# Patient Record
Sex: FEMALE | Race: WHITE | NOT HISPANIC OR LATINO | Employment: FULL TIME | RURAL
[De-identification: names, ages, dates, MRNs, and addresses within clinical notes are randomized per-mention and may not be internally consistent; named-entity substitution may affect disease eponyms.]

---

## 2020-07-14 ENCOUNTER — HISTORICAL (OUTPATIENT)
Dept: ADMINISTRATIVE | Facility: HOSPITAL | Age: 40
End: 2020-07-14

## 2021-02-17 ENCOUNTER — HISTORICAL (OUTPATIENT)
Dept: ADMINISTRATIVE | Facility: HOSPITAL | Age: 41
End: 2021-02-17

## 2021-02-24 ENCOUNTER — HISTORICAL (OUTPATIENT)
Dept: ADMINISTRATIVE | Facility: HOSPITAL | Age: 41
End: 2021-02-24

## 2022-03-03 ENCOUNTER — OFFICE VISIT (OUTPATIENT)
Dept: FAMILY MEDICINE | Facility: CLINIC | Age: 42
End: 2022-03-03
Payer: OTHER GOVERNMENT

## 2022-03-03 VITALS
HEIGHT: 63 IN | OXYGEN SATURATION: 98 % | TEMPERATURE: 98 F | WEIGHT: 142.38 LBS | BODY MASS INDEX: 25.23 KG/M2 | SYSTOLIC BLOOD PRESSURE: 116 MMHG | RESPIRATION RATE: 20 BRPM | DIASTOLIC BLOOD PRESSURE: 70 MMHG | HEART RATE: 93 BPM

## 2022-03-03 DIAGNOSIS — R42 VERTIGO: Primary | ICD-10-CM

## 2022-03-03 PROBLEM — E03.9 HYPOTHYROIDISM: Status: ACTIVE | Noted: 2022-03-03

## 2022-03-03 PROBLEM — Z85.850 HX OF PAPILLARY THYROID CARCINOMA: Status: ACTIVE | Noted: 2020-02-01

## 2022-03-03 PROBLEM — E89.0 POSTOPERATIVE HYPOTHYROIDISM: Status: ACTIVE | Noted: 2022-03-03

## 2022-03-03 PROCEDURE — 99213 PR OFFICE/OUTPT VISIT, EST, LEVL III, 20-29 MIN: ICD-10-PCS | Mod: ,,, | Performed by: NURSE PRACTITIONER

## 2022-03-03 PROCEDURE — 99213 OFFICE O/P EST LOW 20 MIN: CPT | Mod: ,,, | Performed by: NURSE PRACTITIONER

## 2022-03-03 RX ORDER — ONDANSETRON HYDROCHLORIDE 8 MG/1
8 TABLET, FILM COATED ORAL EVERY 8 HOURS PRN
Qty: 30 TABLET | Refills: 0 | Status: SHIPPED | OUTPATIENT
Start: 2022-03-03 | End: 2022-05-20

## 2022-03-03 RX ORDER — LEVOTHYROXINE SODIUM 137 UG/1
137 TABLET ORAL DAILY
COMMUNITY
Start: 2022-02-13

## 2022-03-03 RX ORDER — MECLIZINE HYDROCHLORIDE 25 MG/1
25 TABLET ORAL 3 TIMES DAILY PRN
Qty: 30 TABLET | Refills: 0 | Status: SHIPPED | OUTPATIENT
Start: 2022-03-03 | End: 2022-05-20

## 2022-03-03 NOTE — PROGRESS NOTES
[facility]       PATIENT NAME: Jennifer Mc   : 1980    AGE: 41 y.o. DATE: 2022    MRN: [id number]        Reason for Visit / Chief Complaint: Dizziness (Started yesterday morning. Worse with movement.)     Subjective:     Presents as a walk-in c/o dizziness described as spinning since yesterday morning. Intermittent. Improves when she lies down and gets still then resumes when she changes position. Nausea w/ vomiting yesterday, but only nausea today. Hasn't eaten anything.   No diarrhea, normal BMs.  Has had vertigo in the past but never this severe.  Tried Dramamine without help.   Works from home.    Last appt with Dr. Garcia 2022.    Review of Systems:     Review of Systems   Constitutional: Negative.    HENT: Negative.  Negative for congestion, ear pain, postnasal drip, rhinorrhea, sinus pressure and sore throat.    Eyes: Negative.    Respiratory: Negative.    Cardiovascular: Negative.    Gastrointestinal: Positive for nausea and vomiting. Negative for diarrhea.   Endocrine: Negative.         Hx papillary thyroid carcinoma. Thyroidectomy per Dr. Delcid III. Oncologist, Dr. Garcia is managing thyroid med.   Genitourinary: Negative.    Musculoskeletal: Negative.    Skin: Negative.    Allergic/Immunologic: Negative.    Neurological: Positive for dizziness and headaches.   Hematological: Negative.    Psychiatric/Behavioral: Negative.        Allergies and Medications:   Review of patient's allergies indicates:  No Known Allergies     Current Outpatient Medications on File Prior to Visit   Medication Sig Dispense Refill    levothyroxine (SYNTHROID) 137 MCG Tab tablet Take 137 mcg by mouth once daily.       No current facility-administered medications on file prior to visit.       Medical/Social/Family History:     Past Medical History:   Diagnosis Date    Hx of papillary thyroid carcinoma 2020    Hypothyroidism      "  Social History     Tobacco Use   Smoking Status Never Smoker   Smokeless Tobacco Never Used      Social History     Substance and Sexual Activity   Alcohol Use Not Currently       No family history on file.   Past Surgical History:   Procedure Laterality Date    THYROIDECTOMY  04/2020    papillary carcinoma - Dr. Delcid III        There is no immunization history on file for this patient.       Objective:     Wt Readings from Last 3 Encounters:   03/03/22 1313 64.6 kg (142 lb 6.4 oz)       Vitals:    03/03/22 1313   BP: 116/70   BP Location: Right arm   Patient Position: Sitting   BP Method: Large (Manual)   Pulse: 93   Resp: 20   Temp: 97.9 °F (36.6 °C)   TempSrc: Temporal   SpO2: 98%   Weight: 64.6 kg (142 lb 6.4 oz)   Height: 5' 3" (1.6 m)     Body mass index is 25.23 kg/m².     Physical Exam:    Physical Exam  Vitals and nursing note reviewed.   Constitutional:       Appearance: Normal appearance.   HENT:      Head: Normocephalic.      Right Ear: Tympanic membrane, ear canal and external ear normal.      Left Ear: Tympanic membrane, ear canal and external ear normal.      Nose: Nose normal.      Mouth/Throat:      Mouth: Mucous membranes are moist.      Pharynx: Oropharynx is clear.   Eyes:      Conjunctiva/sclera: Conjunctivae normal.      Pupils: Pupils are equal, round, and reactive to light.   Neck:      Thyroid: No thyroid mass or thyromegaly.      Vascular: Normal carotid pulses. No carotid bruit.   Cardiovascular:      Rate and Rhythm: Normal rate and regular rhythm.      Pulses: Normal pulses.      Heart sounds: Normal heart sounds.   Pulmonary:      Effort: Pulmonary effort is normal.      Breath sounds: Normal breath sounds.   Musculoskeletal:      Cervical back: Neck supple.      Right lower leg: No edema.      Left lower leg: No edema.   Lymphadenopathy:      Cervical: No cervical adenopathy.   Skin:     General: Skin is warm and dry.   Neurological:      General: No focal deficit present.      " Mental Status: She is alert and oriented to person, place, and time.   Psychiatric:         Mood and Affect: Mood normal.         Behavior: Behavior normal.         Assessment:          ICD-10-CM ICD-9-CM   1. Vertigo  R42 780.4        Plan:       Start with meclizine, but if causes too much drowsiness can use zofran during the day.  Rehydration. Change positions slowly.  If vertigo persists, consider imaging with CT or MRI. Could also consider PT or ENT.  Voices understanding.    Vertigo  -     meclizine (ANTIVERT) 25 mg tablet; Take 1 tablet (25 mg total) by mouth 3 (three) times daily as needed for Dizziness or Nausea.  Dispense: 30 tablet; Refill: 0    Other orders  -     ondansetron (ZOFRAN) 8 MG tablet; Take 1 tablet (8 mg total) by mouth every 8 (eight) hours as needed for Nausea.  Dispense: 30 tablet; Refill: 0        Current Outpatient Medications:     levothyroxine (SYNTHROID) 137 MCG Tab tablet, Take 137 mcg by mouth once daily., Disp: , Rfl:     meclizine (ANTIVERT) 25 mg tablet, Take 1 tablet (25 mg total) by mouth 3 (three) times daily as needed for Dizziness or Nausea., Disp: 30 tablet, Rfl: 0    ondansetron (ZOFRAN) 8 MG tablet, Take 1 tablet (8 mg total) by mouth every 8 (eight) hours as needed for Nausea., Disp: 30 tablet, Rfl: 0    Requested Prescriptions     Signed Prescriptions Disp Refills    meclizine (ANTIVERT) 25 mg tablet 30 tablet 0     Sig: Take 1 tablet (25 mg total) by mouth 3 (three) times daily as needed for Dizziness or Nausea.    ondansetron (ZOFRAN) 8 MG tablet 30 tablet 0     Sig: Take 1 tablet (8 mg total) by mouth every 8 (eight) hours as needed for Nausea.       F/u as needed or if symptoms worsen or persist.    Signature: Alexandria HOOD-BC   Reviewed by ELPIDIO Figueroa MD 04/14/22

## 2022-04-20 LAB — PAP RECOMMENDATION EXT: NORMAL

## 2022-05-18 ENCOUNTER — OFFICE VISIT (OUTPATIENT)
Dept: FAMILY MEDICINE | Facility: CLINIC | Age: 42
End: 2022-05-18
Payer: OTHER GOVERNMENT

## 2022-05-18 VITALS
DIASTOLIC BLOOD PRESSURE: 68 MMHG | BODY MASS INDEX: 23.21 KG/M2 | RESPIRATION RATE: 18 BRPM | WEIGHT: 131 LBS | TEMPERATURE: 98 F | SYSTOLIC BLOOD PRESSURE: 114 MMHG | HEIGHT: 63 IN | OXYGEN SATURATION: 98 % | HEART RATE: 81 BPM

## 2022-05-18 DIAGNOSIS — K64.9 HEMORRHOIDS, UNSPECIFIED HEMORRHOID TYPE: Primary | ICD-10-CM

## 2022-05-18 PROCEDURE — 99213 PR OFFICE/OUTPT VISIT, EST, LEVL III, 20-29 MIN: ICD-10-PCS | Mod: ,,, | Performed by: NURSE PRACTITIONER

## 2022-05-18 PROCEDURE — 99213 OFFICE O/P EST LOW 20 MIN: CPT | Mod: ,,, | Performed by: NURSE PRACTITIONER

## 2022-05-18 RX ORDER — HYDROCORTISONE 25 MG/G
CREAM TOPICAL 2 TIMES DAILY
Qty: 28 G | Refills: 2 | Status: SHIPPED | OUTPATIENT
Start: 2022-05-18

## 2022-05-18 RX ORDER — LIDOCAINE HYDROCHLORIDE 20 MG/ML
JELLY TOPICAL 3 TIMES DAILY
Qty: 30 ML | Refills: 2 | Status: SHIPPED | OUTPATIENT
Start: 2022-05-18

## 2022-05-18 NOTE — PROGRESS NOTES
Subjective:       Patient ID: Jennifer Mc is a 41 y.o. female.    Chief Complaint: Hemorrhoids (X 1 week)    Hemorrhoid flare- hx of the same- no bleeding- chronic constipation    Review of Systems   Constitutional: Negative for chills and fever.   Gastrointestinal: Positive for constipation and rectal pain. Negative for abdominal distention and abdominal pain.         Objective:      Physical Exam  Constitutional:       General: She is not in acute distress.     Appearance: Normal appearance. She is not ill-appearing, toxic-appearing or diaphoretic.   HENT:      Head: Normocephalic.   Eyes:      Pupils: Pupils are equal, round, and reactive to light.   Cardiovascular:      Rate and Rhythm: Normal rate and regular rhythm.      Pulses: Normal pulses.      Heart sounds: Normal heart sounds. No murmur heard.  Pulmonary:      Effort: Pulmonary effort is normal.      Breath sounds: Normal breath sounds. No wheezing or rhonchi.   Abdominal:      General: Bowel sounds are normal.      Palpations: Abdomen is soft.   Genitourinary:         Comments: A 1 cm non thrombosed, pink hemorrhoids noted- no rectal bleeding  Musculoskeletal:         General: Normal range of motion.   Skin:     General: Skin is warm and dry.      Findings: No lesion or rash.   Neurological:      Mental Status: She is alert and oriented to person, place, and time.   Psychiatric:         Mood and Affect: Mood normal.         Behavior: Behavior normal.         Thought Content: Thought content normal.         Judgment: Judgment normal.         No visits with results within 6 Month(s) from this visit.   Latest known visit with results is:   No results found for any previous visit.      Assessment:       1. Hemorrhoids, unspecified hemorrhoid type        Plan:   Hemorrhoids, unspecified hemorrhoid type  -     hydrocortisone 2.5 % cream; Apply topically 2 (two) times daily.  Dispense: 28 g; Refill: 2  -     LIDOcaine HCL 2% (XYLOCAINE) 2 % jelly;  Apply topically 3 (three) times daily.  Dispense: 30 mL; Refill: 2       Colace daily  Increase fluid intake  RTC if symptoms persist and we will refer to GI if necessary

## 2022-05-20 ENCOUNTER — OFFICE VISIT (OUTPATIENT)
Dept: FAMILY MEDICINE | Facility: CLINIC | Age: 42
End: 2022-05-20
Payer: OTHER GOVERNMENT

## 2022-05-20 VITALS
BODY MASS INDEX: 23 KG/M2 | TEMPERATURE: 99 F | DIASTOLIC BLOOD PRESSURE: 87 MMHG | HEIGHT: 63 IN | WEIGHT: 129.81 LBS | RESPIRATION RATE: 16 BRPM | HEART RATE: 105 BPM | OXYGEN SATURATION: 98 % | SYSTOLIC BLOOD PRESSURE: 98 MMHG

## 2022-05-20 DIAGNOSIS — K64.9 HEMORRHOIDS, UNSPECIFIED HEMORRHOID TYPE: Primary | ICD-10-CM

## 2022-05-20 PROCEDURE — 99213 PR OFFICE/OUTPT VISIT, EST, LEVL III, 20-29 MIN: ICD-10-PCS | Mod: 25,,, | Performed by: NURSE PRACTITIONER

## 2022-05-20 PROCEDURE — 96372 PR INJECTION,THERAP/PROPH/DIAG2ST, IM OR SUBCUT: ICD-10-PCS | Mod: ,,, | Performed by: NURSE PRACTITIONER

## 2022-05-20 PROCEDURE — 96372 THER/PROPH/DIAG INJ SC/IM: CPT | Mod: ,,, | Performed by: NURSE PRACTITIONER

## 2022-05-20 PROCEDURE — 99213 OFFICE O/P EST LOW 20 MIN: CPT | Mod: 25,,, | Performed by: NURSE PRACTITIONER

## 2022-05-20 RX ORDER — METHYLPREDNISOLONE ACETATE 40 MG/ML
40 INJECTION, SUSPENSION INTRA-ARTICULAR; INTRALESIONAL; INTRAMUSCULAR; SOFT TISSUE
Status: COMPLETED | OUTPATIENT
Start: 2022-05-20 | End: 2022-05-20

## 2022-05-20 RX ORDER — KETOROLAC TROMETHAMINE 30 MG/ML
60 INJECTION, SOLUTION INTRAMUSCULAR; INTRAVENOUS
Status: COMPLETED | OUTPATIENT
Start: 2022-05-20 | End: 2022-05-20

## 2022-05-20 RX ORDER — DEXAMETHASONE SODIUM PHOSPHATE 4 MG/ML
4 INJECTION, SOLUTION INTRA-ARTICULAR; INTRALESIONAL; INTRAMUSCULAR; INTRAVENOUS; SOFT TISSUE
Status: COMPLETED | OUTPATIENT
Start: 2022-05-20 | End: 2022-05-20

## 2022-05-20 RX ADMIN — DEXAMETHASONE SODIUM PHOSPHATE 4 MG: 4 INJECTION, SOLUTION INTRA-ARTICULAR; INTRALESIONAL; INTRAMUSCULAR; INTRAVENOUS; SOFT TISSUE at 05:05

## 2022-05-20 RX ADMIN — KETOROLAC TROMETHAMINE 60 MG: 30 INJECTION, SOLUTION INTRAMUSCULAR; INTRAVENOUS at 05:05

## 2022-05-20 RX ADMIN — METHYLPREDNISOLONE ACETATE 40 MG: 40 INJECTION, SUSPENSION INTRA-ARTICULAR; INTRALESIONAL; INTRAMUSCULAR; SOFT TISSUE at 05:05

## 2022-05-20 NOTE — PROGRESS NOTES
"Subjective:       Patient ID: Jennifer Mc is a 41 y.o. female.    Chief Complaint: Hemorrhoids (Hemorrhoid issues)    Saw Jackeline Maldonado on Wednesday and prescribed some creams to use on hemorrhoids. She is not better. She c/o chronic constipation. Enc Miralax daily.     Review of Systems   Constitutional: Negative.    Respiratory: Negative.    Cardiovascular: Negative.    Gastrointestinal: Positive for constipation. Negative for blood in stool and melena.          Reviewed family, medical, surgical, and social history.    Objective:      BP 98/87   Pulse 105   Temp 99.2 °F (37.3 °C)   Resp 16   Ht 5' 3" (1.6 m)   Wt 58.9 kg (129 lb 12.8 oz)   SpO2 98%   BMI 22.99 kg/m²   Physical Exam  Vitals and nursing note reviewed.   Constitutional:       General: She is not in acute distress.     Appearance: Normal appearance. She is normal weight. She is not ill-appearing, toxic-appearing or diaphoretic.   Cardiovascular:      Rate and Rhythm: Normal rate and regular rhythm.      Heart sounds: Normal heart sounds.   Pulmonary:      Effort: Pulmonary effort is normal.      Breath sounds: Normal breath sounds.   Abdominal:      Comments: approx 1 cm engorged external hemorrhoid.    Musculoskeletal:      Cervical back: Normal range of motion and neck supple.   Skin:     General: Skin is warm and dry.      Capillary Refill: Capillary refill takes less than 2 seconds.   Neurological:      General: No focal deficit present.      Mental Status: She is alert and oriented to person, place, and time.   Psychiatric:         Mood and Affect: Mood normal.         Behavior: Behavior normal.         Thought Content: Thought content normal.         Judgment: Judgment normal.            No visits with results within 1 Day(s) from this visit.   Latest known visit with results is:   No results found for any previous visit.      Assessment:       1. Hemorrhoids, unspecified hemorrhoid type        Plan:       Hemorrhoids, unspecified " hemorrhoid type  -     dexamethasone injection 4 mg  -     methylPREDNISolone acetate injection 40 mg  -     ketorolac injection 60 mg    Keep using 2% hydrocortisone cream  Notify me if not improving in 1-2 days  Take miralax every night  RTC PRN          Risks, benefits, and side effects were discussed with the patient. All questions were answered to the fullest satisfaction of the patient, and pt verbalized understanding and agreement to treatment plan. Pt was to call with any new or worsening symptoms, or present to the ER.

## 2022-05-24 ENCOUNTER — OFFICE VISIT (OUTPATIENT)
Dept: SURGERY | Facility: CLINIC | Age: 42
End: 2022-05-24
Attending: SURGERY
Payer: OTHER GOVERNMENT

## 2022-05-24 VITALS — BODY MASS INDEX: 23.18 KG/M2 | WEIGHT: 130.81 LBS | HEIGHT: 63 IN

## 2022-05-24 DIAGNOSIS — K64.5 THROMBOSED HEMORRHOIDS: Primary | ICD-10-CM

## 2022-05-24 PROCEDURE — 99499 NO LOS: ICD-10-PCS | Mod: S$PBB,,, | Performed by: SURGERY

## 2022-05-24 PROCEDURE — 99499 UNLISTED E&M SERVICE: CPT | Mod: S$PBB,,, | Performed by: SURGERY

## 2022-05-24 PROCEDURE — 99213 OFFICE O/P EST LOW 20 MIN: CPT | Mod: PBBFAC | Performed by: SURGERY

## 2022-05-24 NOTE — PROCEDURES
"Jennifer Mc is a 41 y.o. female patient.    Weight: 59.3 kg (130 lb 12.8 oz) (05/24/22 1127)  Height: 5' 3" (160 cm) (05/24/22 1127)       Incision and Drainage    Date/Time: 5/24/2022 6:33 PM  Performed by: Lul Krishnan MD  Authorized by: Lul Krishnan MD     Consent Done?:  Yes (Verbal)    Type:  Hematoma (hemorrhoid thrombosis)  Body area:  Anogenital  Location details:  Perianal  Anesthesia:  Local infiltration  Local anesthetic: lidocaine 2% without epinephrine  Scalpel size:  10  Incision type:  Single straight  Incision depth: subcutaneous    Complexity:  Simple  Drainage characteristics: thrombus/clot.  Drainage amount:  Moderate  Wound treatment:  Incision and drainage (closed with single Chromic stitch)  Patient tolerance:  Patient tolerated the procedure well with no immediate complications        5/24/2022    "

## 2022-05-24 NOTE — PROGRESS NOTES
"Subjective:       Patient ID: Jennifer Mc is a 41 y.o. female.    Chief Complaint: Pre-op Exam (Thrombosed hem )    42 y/o presents with anal pain for about one week.  Seen by Dr. Brower, GYN, today and diagnosed with thrombosed hemorrhoid.  Sent for I/D today.       family history is not on file.  Past Medical History:   Diagnosis Date    Cancer     Hx of papillary thyroid carcinoma 02/2020    Hypothyroidism       Past Surgical History:   Procedure Laterality Date    THYROIDECTOMY  04/2020    papillary carcinoma - Dr. Delcid III       reports that she has never smoked. She has never used smokeless tobacco. She reports previous alcohol use. She reports that she does not use drugs.     Review of Systems   All other systems reviewed and are negative.         Objective:      Ht 5' 3" (1.6 m)   Wt 59.3 kg (130 lb 12.8 oz)   BMI 23.17 kg/m²    Physical Exam  Constitutional:       General: She is awake.      Appearance: Normal appearance.   HENT:      Head: Atraumatic.   Cardiovascular:      Rate and Rhythm: Normal rate and regular rhythm.   Pulmonary:      Effort: Pulmonary effort is normal.   Chest:      Chest wall: No deformity.   Abdominal:      General: There is no distension.      Palpations: Abdomen is soft. There is no mass.      Tenderness: There is no abdominal tenderness.   Genitourinary:     Comments: Thrombosed external hemorrhoid, left lateral position.  Musculoskeletal:         General: No swelling.      Right lower leg: No edema.      Left lower leg: No edema.   Skin:     General: Skin is warm and dry.      Capillary Refill: Capillary refill takes less than 2 seconds.   Neurological:      General: No focal deficit present.      Mental Status: She is alert.   Psychiatric:         Mood and Affect: Mood normal.           Assessment/Plan:         Thrombosed hemorrhoids         Problem List Items Addressed This Visit        GI    Thrombosed hemorrhoids - Primary    Current Assessment & Plan     " I/D in office today

## 2023-02-01 ENCOUNTER — HOSPITAL ENCOUNTER (OUTPATIENT)
Dept: RADIOLOGY | Facility: HOSPITAL | Age: 43
Discharge: HOME | End: 2023-02-01
Payer: OTHER GOVERNMENT

## 2023-02-01 DIAGNOSIS — Z12.31 VISIT FOR SCREENING MAMMOGRAM: ICD-10-CM

## 2023-02-01 PROCEDURE — 77067 MAMMO DIGITAL SCREENING BILAT: ICD-10-PCS | Mod: 26,,, | Performed by: RADIOLOGY

## 2023-02-01 PROCEDURE — 77067 SCR MAMMO BI INCL CAD: CPT | Mod: 26,,, | Performed by: RADIOLOGY

## 2023-02-01 PROCEDURE — 77067 SCR MAMMO BI INCL CAD: CPT | Mod: TC

## 2023-04-18 ENCOUNTER — PATIENT OUTREACH (OUTPATIENT)
Dept: ADMINISTRATIVE | Facility: HOSPITAL | Age: 43
End: 2023-04-18

## 2023-05-03 DIAGNOSIS — M25.551 BILATERAL HIP PAIN: Primary | ICD-10-CM

## 2023-05-03 DIAGNOSIS — M25.552 BILATERAL HIP PAIN: Primary | ICD-10-CM

## 2023-05-05 ENCOUNTER — HOSPITAL ENCOUNTER (OUTPATIENT)
Dept: RADIOLOGY | Facility: HOSPITAL | Age: 43
Discharge: HOME | End: 2023-05-05
Attending: NURSE PRACTITIONER
Payer: OTHER GOVERNMENT

## 2023-05-05 ENCOUNTER — OFFICE VISIT (OUTPATIENT)
Dept: ORTHOPEDICS | Facility: CLINIC | Age: 43
End: 2023-05-05
Payer: OTHER GOVERNMENT

## 2023-05-05 DIAGNOSIS — M25.552 BILATERAL HIP PAIN: ICD-10-CM

## 2023-05-05 DIAGNOSIS — M25.552 BILATERAL HIP PAIN: Primary | ICD-10-CM

## 2023-05-05 DIAGNOSIS — M25.551 BILATERAL HIP PAIN: ICD-10-CM

## 2023-05-05 DIAGNOSIS — M25.551 BILATERAL HIP PAIN: Primary | ICD-10-CM

## 2023-05-05 DIAGNOSIS — M70.62 GREATER TROCHANTERIC BURSITIS OF BOTH HIPS: ICD-10-CM

## 2023-05-05 DIAGNOSIS — M70.61 GREATER TROCHANTERIC BURSITIS OF BOTH HIPS: ICD-10-CM

## 2023-05-05 PROCEDURE — 20610 DRAIN/INJ JOINT/BURSA W/O US: CPT | Mod: PBBFAC | Performed by: NURSE PRACTITIONER

## 2023-05-05 PROCEDURE — 73522 XR HIP 3 OR 4 VIEWS BILATERAL: ICD-10-PCS | Mod: 26,,, | Performed by: RADIOLOGY

## 2023-05-05 PROCEDURE — 99212 OFFICE O/P EST SF 10 MIN: CPT | Mod: PBBFAC | Performed by: NURSE PRACTITIONER

## 2023-05-05 PROCEDURE — 20610 LARGE JOINT ASPIRATION/INJECTION: L GREATER TROCHANTERIC BURSA: ICD-10-PCS | Mod: S$PBB,50,, | Performed by: NURSE PRACTITIONER

## 2023-05-05 PROCEDURE — 73522 X-RAY EXAM HIPS BI 3-4 VIEWS: CPT | Mod: 26,,, | Performed by: RADIOLOGY

## 2023-05-05 PROCEDURE — 99203 PR OFFICE/OUTPT VISIT, NEW, LEVL III, 30-44 MIN: ICD-10-PCS | Mod: S$PBB,25,, | Performed by: NURSE PRACTITIONER

## 2023-05-05 PROCEDURE — 73522 X-RAY EXAM HIPS BI 3-4 VIEWS: CPT | Mod: TC

## 2023-05-05 PROCEDURE — 99203 OFFICE O/P NEW LOW 30 MIN: CPT | Mod: S$PBB,25,, | Performed by: NURSE PRACTITIONER

## 2023-05-05 RX ORDER — TRIAMCINOLONE ACETONIDE 40 MG/ML
80 INJECTION, SUSPENSION INTRA-ARTICULAR; INTRAMUSCULAR
Status: DISCONTINUED | OUTPATIENT
Start: 2023-05-05 | End: 2023-05-05 | Stop reason: HOSPADM

## 2023-05-05 RX ORDER — NAPROXEN 500 MG/1
500 TABLET ORAL 2 TIMES DAILY WITH MEALS
Qty: 60 TABLET | Refills: 0 | Status: SHIPPED | OUTPATIENT
Start: 2023-05-05

## 2023-05-05 RX ADMIN — TRIAMCINOLONE ACETONIDE 80 MG: 40 INJECTION, SUSPENSION INTRA-ARTICULAR; INTRAMUSCULAR at 10:05

## 2023-05-05 NOTE — PROGRESS NOTES
HPI:   Jennifer Mc is a pleasant 42 y.o. patient who reports to clinic for evaluation of bilateral hip pain.  Patient reported pain started few weeks ago.  No injuries.  All of her pain is on the lateral side of both hips.  Reported is painful to lay on her side at night.  Pain is worse at night..     Injury onset and description: none  Patient's occupation  This is not a work related injury.   This injury has been non-responsive to conservative care. The pain is worse with repetitive use, and strenuous activity is very difficult.  her pain improves with rest.   PAST MEDICAL HISTORY:   Past Medical History:   Diagnosis Date    Cancer     Hx of papillary thyroid carcinoma 02/2020    Hypothyroidism      PAST SURGICAL HISTORY:   Past Surgical History:   Procedure Laterality Date    THYROIDECTOMY  04/2020    papillary carcinoma - Dr. Delcid III     MEDICATIONS:    Current Outpatient Medications:     hydrocortisone 2.5 % cream, Apply topically 2 (two) times daily., Disp: 28 g, Rfl: 2    levothyroxine (SYNTHROID) 137 MCG Tab tablet, Take 137 mcg by mouth once daily., Disp: , Rfl:     LIDOcaine HCL 2% (XYLOCAINE) 2 % jelly, Apply topically 3 (three) times daily., Disp: 30 mL, Rfl: 2  ALLERGIES:   Review of patient's allergies indicates:  No Known Allergies      PHYSICAL EXAM:  VITAL SIGNS: There were no vitals taken for this visit.  General: Well-developed well-nourished 42 y.o. femalein no acute distress;Cardiovascular: Regular rhythm by palpation of distal pulse, normal color and temperature, no concerning varicosities on symptomatic side Lungs: No labored breathing or wheezing appreciated Neuro: Alert and oriented ×3 Psychiatric: well oriented to person, place and time, demonstrates normal mood and affect Skin: No rashes, lesions or ulcers, normal temperature, turgor, and texture on uninvolved extremity    General    Nursing note and vitals reviewed.  Constitutional: She is oriented to person, place, and  time. She appears well-nourished.   HENT:   Head: Normocephalic and atraumatic.   Nose: Nose normal.   Eyes: EOM are normal. Pupils are equal, round, and reactive to light.   Neck: Neck supple.   Cardiovascular:  Normal rate and regular rhythm.            Pulmonary/Chest: Effort normal. No respiratory distress.   Abdominal: She exhibits no distension. There is no abdominal tenderness. There is no guarding.   Neurological: She is alert and oriented to person, place, and time. She has normal reflexes.   Psychiatric: She has a normal mood and affect. Her behavior is normal. Judgment and thought content normal.     General Musculoskeletal Exam   Gait: Trendelenburg         Right Hip Exam     Inspection   Swelling: absent  Bruising: absent  Erythema: absent    Tenderness   The patient tender to palpation of the trochanteric bursa.    Range of Motion   Extension:  normal   Flexion:  normal   External rotation:  normal   Internal rotation:  normal     Other   Sensation: normal  Left Hip Exam     Inspection   Swelling: absent  Erythema: absent  Bruising: absent    Tenderness   The patient tender to palpation of the trochanteric bursa.    Range of Motion   Extension:  normal   Flexion:  normal   External rotation:  normal   Internal rotation: normal     Other   Sensation: normal          Muscle Strength   Right Lower Extremity   Hip Abduction: 4/5   Hip Adduction: 4/5   Left Lower Extremity   Hip Abduction: 4/5   Hip Adduction: 4/5     Vascular Exam     Right Pulses  Dorsalis Pedis:      2+          Left Pulses  Dorsalis Pedis:      2+  Posterior Tibial:      2+        IMAGING:  X-Ray Hip 3 or 4 views Bilateral    Result Date: 5/5/2023  EXAMINATION: XR HIP 3 OR 4 VIEWS BILATERAL CLINICAL HISTORY: Pain in right hip COMPARISON: None available TECHNIQUE: XR HIP 3  VIEWS BILATERAL FINDINGS: No evidence of fracture seen.  The alignment of the joints appears normal.  Mild bilateral hip degenerative change is present.  No soft  tissue abnormality is seen.     Mild hip osteoarthrosis. Electronically signed by: Rene Bob Date:    05/05/2023 Time:    09:58        ASSESSMENT:      ICD-10-CM ICD-9-CM   1. Bilateral hip pain  M25.551 719.45    M25.552        PLAN:     -Findings and treatment options were discussed with the patient  -All questions answered  naproxen p.o. b.i.d..  Bilateral greater trochanter bursitis hip injections.  Return to clinic in 6 weeks  There are no Patient Instructions on file for this visit.  No orders of the defined types were placed in this encounter.    Large Joint Aspiration/Injection: L greater trochanteric bursa    Date/Time: 5/5/2023 10:00 AM  Performed by: ROBERTO Herrera  Authorized by: ROBERTO Herrera     Consent Done?:  Yes (Verbal)  Indications:  Pain  Local anesthetic:  Bupivacaine 0.25% without epinephrine    Details:  Needle Size:  22 G  Approach:  Lateral  Location:  Hip  Site:  L greater trochanteric bursa  Medications:  80 mg triamcinolone acetonide 40 mg/mL  Patient tolerance:  Patient tolerated the procedure well with no immediate complications  Large Joint Aspiration/Injection: R greater trochanteric bursa    Date/Time: 5/5/2023 10:00 AM  Performed by: ROBERTO Herrera  Authorized by: ROBERTO Herrera     Consent Done?:  Yes (Verbal)  Indications:  Pain  Local anesthetic:  Bupivacaine 0.25% without epinephrine  Anesthetic total (ml):  4      Details:  Needle Size:  22 G  Approach:  Lateral  Location:  Hip  Site:  R greater trochanteric bursa  Medications:  80 mg triamcinolone acetonide 40 mg/mL  Patient tolerance:  Patient tolerated the procedure well with no immediate complications

## 2024-03-20 ENCOUNTER — HOSPITAL ENCOUNTER (OUTPATIENT)
Dept: RADIOLOGY | Facility: HOSPITAL | Age: 44
Discharge: HOME | End: 2024-03-20
Payer: OTHER GOVERNMENT

## 2024-03-20 DIAGNOSIS — Z12.31 VISIT FOR SCREENING MAMMOGRAM: ICD-10-CM

## 2024-03-20 PROCEDURE — 77067 SCR MAMMO BI INCL CAD: CPT | Mod: 26,,, | Performed by: RADIOLOGY

## 2024-03-20 PROCEDURE — 77067 SCR MAMMO BI INCL CAD: CPT | Mod: TC

## 2024-03-20 PROCEDURE — 77063 BREAST TOMOSYNTHESIS BI: CPT | Mod: 26,,, | Performed by: RADIOLOGY

## 2024-07-25 ENCOUNTER — OFFICE VISIT (OUTPATIENT)
Dept: FAMILY MEDICINE | Facility: CLINIC | Age: 44
End: 2024-07-25
Payer: OTHER GOVERNMENT

## 2024-07-25 VITALS
HEART RATE: 81 BPM | DIASTOLIC BLOOD PRESSURE: 70 MMHG | BODY MASS INDEX: 25.27 KG/M2 | OXYGEN SATURATION: 96 % | RESPIRATION RATE: 18 BRPM | HEIGHT: 63 IN | SYSTOLIC BLOOD PRESSURE: 100 MMHG | WEIGHT: 142.63 LBS

## 2024-07-25 DIAGNOSIS — J00 ACUTE RHINITIS: ICD-10-CM

## 2024-07-25 DIAGNOSIS — H65.02 ACUTE SEROUS OTITIS MEDIA OF LEFT EAR, RECURRENCE NOT SPECIFIED: Primary | ICD-10-CM

## 2024-07-25 RX ORDER — LORATADINE 10 MG/1
10 TABLET ORAL DAILY
Qty: 30 TABLET | Refills: 0 | Status: SHIPPED | OUTPATIENT
Start: 2024-07-25

## 2024-07-25 RX ORDER — FLUTICASONE PROPIONATE 50 MCG
2 SPRAY, SUSPENSION (ML) NASAL DAILY
Qty: 16 G | Refills: 11 | Status: SHIPPED | OUTPATIENT
Start: 2024-07-25

## 2024-07-25 RX ORDER — AMOXICILLIN 500 MG/1
500 CAPSULE ORAL EVERY 12 HOURS
Qty: 14 CAPSULE | Refills: 0 | Status: SHIPPED | OUTPATIENT
Start: 2024-07-25 | End: 2024-08-01

## 2024-07-25 NOTE — PROGRESS NOTES
Subjective:       Patient ID: Jennifer Mc is a 43 y.o. female.    Chief Complaint: Otalgia (Pt stated that she has a earache in her left ear , pt stated that she have tried taking mucinex and a Advil but neither is working , pt stated that she have had a earache since  7/19/24)      Active Problem List with Overview Notes    Diagnosis Date Noted    Thrombosed hemorrhoids 05/18/2022    Vertigo 03/03/2022    Postoperative hypothyroidism 03/03/2022    Hx of papillary thyroid carcinoma 02/2020        Review of Systems   Constitutional:  Negative for chills, fatigue and fever.   HENT:  Positive for congestion and ear pain. Negative for hearing loss, postnasal drip, rhinorrhea, sore throat, tinnitus and voice change.    Respiratory:  Negative for apnea, cough, choking, chest tightness and shortness of breath.    Cardiovascular:  Negative for chest pain, palpitations and leg swelling.   Gastrointestinal:  Negative for abdominal pain, constipation, diarrhea and nausea.   Genitourinary:  Negative for difficulty urinating.   Neurological:  Negative for dizziness, syncope, weakness and headaches.   Psychiatric/Behavioral:  Negative for sleep disturbance.             Objective:      Vitals:    07/25/24 0817   BP: 100/70   Pulse: 81   Resp: 18      Physical Exam  Constitutional:       Appearance: Normal appearance. She is well-developed and normal weight.   HENT:      Head: Normocephalic.      Right Ear: Tympanic membrane, ear canal and external ear normal.      Left Ear: External ear normal. A middle ear effusion is present.      Nose: Nose normal.      Mouth/Throat:      Lips: Pink.      Mouth: Mucous membranes are moist.      Pharynx: Oropharynx is clear. Posterior oropharyngeal erythema present.   Eyes:      General: Lids are normal.      Pupils: Pupils are equal, round, and reactive to light.   Cardiovascular:      Rate and Rhythm: Normal rate and regular rhythm.      Pulses: Normal pulses.      Heart sounds:  Normal heart sounds.   Pulmonary:      Effort: Pulmonary effort is normal.      Breath sounds: Normal breath sounds.   Abdominal:      Palpations: Abdomen is soft.   Musculoskeletal:         General: Normal range of motion.      Cervical back: Normal range of motion.   Skin:     General: Skin is warm and dry.   Neurological:      Mental Status: She is alert and oriented to person, place, and time.   Psychiatric:         Attention and Perception: Attention normal.         Mood and Affect: Mood normal.         Speech: Speech normal.         Behavior: Behavior normal. Behavior is cooperative.       Assessment:       1. Acute serous otitis media of left ear, recurrence not specified    2. Acute rhinitis        Plan:   Follow up in clinic in 7-10 days if symptoms persist.  Problem List Items Addressed This Visit    None  Visit Diagnoses       Acute serous otitis media of left ear, recurrence not specified    -  Primary    Relevant Medications    loratadine (CLARITIN) 10 mg tablet    fluticasone propionate (FLONASE) 50 mcg/actuation nasal spray    amoxicillin (AMOXIL) 500 MG capsule    Acute rhinitis        Relevant Medications    loratadine (CLARITIN) 10 mg tablet    fluticasone propionate (FLONASE) 50 mcg/actuation nasal spray            Health Maintenance:  Health Maintenance Topics with due status: Not Due       Topic Last Completion Date    Cervical Cancer Screening 04/20/2022    Colorectal Cancer Screening 01/09/2024    Mammogram 03/20/2024    Influenza Vaccine Not Due           [facility]   7/25/24

## 2024-07-30 ENCOUNTER — TELEPHONE (OUTPATIENT)
Dept: FAMILY MEDICINE | Facility: CLINIC | Age: 44
End: 2024-07-30

## 2024-07-30 NOTE — TELEPHONE ENCOUNTER
pt callled and stated  that she came in last week and saw Np for a possible earache and she was calling to let physician know that her ear have not got any better and that she would like to see what she needs top do as getting her ear better .    I went ahead and scheduled pt an appointment for August 5 due to that being the earliest that I could get pt in , but pt wanted me to see if it was any way you can get her seen anytime earlier, pt stated that she is having severe pain and don't think she can wait til August 5

## 2024-07-31 ENCOUNTER — OFFICE VISIT (OUTPATIENT)
Dept: OTOLARYNGOLOGY | Facility: CLINIC | Age: 44
End: 2024-07-31
Payer: OTHER GOVERNMENT

## 2024-07-31 VITALS — WEIGHT: 142 LBS | HEIGHT: 63 IN | BODY MASS INDEX: 25.16 KG/M2

## 2024-07-31 DIAGNOSIS — H65.22 LEFT CHRONIC SEROUS OTITIS MEDIA: Primary | ICD-10-CM

## 2024-07-31 DIAGNOSIS — H90.2 CONDUCTIVE HEARING LOSS, UNSPECIFIED LATERALITY: ICD-10-CM

## 2024-07-31 PROCEDURE — 99999 PR PBB SHADOW E&M-EST. PATIENT-LVL III: CPT | Mod: PBBFAC,,, | Performed by: OTOLARYNGOLOGY

## 2024-07-31 PROCEDURE — 99203 OFFICE O/P NEW LOW 30 MIN: CPT | Mod: S$PBB,,, | Performed by: OTOLARYNGOLOGY

## 2024-07-31 PROCEDURE — 99213 OFFICE O/P EST LOW 20 MIN: CPT | Mod: PBBFAC | Performed by: OTOLARYNGOLOGY

## 2024-07-31 NOTE — PROGRESS NOTES
Subjective:       Patient ID: Jennifer Mc is a 43 y.o. female.    Chief Complaint: Otalgia (Patient complains of having pain in her left ear for 2 weeks. She is currently taking Amoxil, Claritin and using Flonase as directed with no relief.) and Ear Fullness (Patient complains of pressure and fluid in her left ear.)    Otalgia     Ear Fullness       Review of Systems   HENT:  Positive for ear pain.    All other systems reviewed and are negative.      Objective:      Physical Exam  General: NAD  Head: Normocephalic, atraumatic, no facial asymmetry/normal strength,  Ears: Both auricules normal in appearance, w/o deformities tympanic membranes old surgery evident on left some fluid seen external auditory canals normal  Nose: External nose w/o deformities normal turbinates no drainage or inflammation  Oral Cavity: Lips, gums, floor of mouth, tongue hard palate, and buccal mucosa without mass/lesion  Oropharynx: Mucosa pink and moist, soft palate, posterior pharynx and oropharyngeal wall without mass/lesion  Neck: Supple, symmetric, trachea midline, no palpable mass/lesion, no palpable cervical lymphadenopathy  Skin: Warm and dry, no concerning lesions  Respiratory: Respirations even, unlabored  Assessment:       1. Left chronic serous otitis media    2. Conductive hearing loss, unspecified laterality        Plan:       Finish antibiotics   Continue flonase  F/u 1 month

## 2024-11-04 ENCOUNTER — OFFICE VISIT (OUTPATIENT)
Dept: FAMILY MEDICINE | Facility: CLINIC | Age: 44
End: 2024-11-04
Payer: OTHER GOVERNMENT

## 2024-11-04 VITALS
HEART RATE: 78 BPM | HEIGHT: 63 IN | DIASTOLIC BLOOD PRESSURE: 67 MMHG | BODY MASS INDEX: 27.25 KG/M2 | SYSTOLIC BLOOD PRESSURE: 97 MMHG | WEIGHT: 153.81 LBS | RESPIRATION RATE: 18 BRPM | TEMPERATURE: 98 F | OXYGEN SATURATION: 97 %

## 2024-11-04 DIAGNOSIS — Z13.1 DIABETES MELLITUS SCREENING: ICD-10-CM

## 2024-11-04 DIAGNOSIS — G89.29 CHRONIC PAIN OF MULTIPLE JOINTS: ICD-10-CM

## 2024-11-04 DIAGNOSIS — Z13.220 SCREENING FOR HYPERLIPIDEMIA: ICD-10-CM

## 2024-11-04 DIAGNOSIS — Z11.4 SCREENING FOR HIV (HUMAN IMMUNODEFICIENCY VIRUS): ICD-10-CM

## 2024-11-04 DIAGNOSIS — Z11.59 NEED FOR HEPATITIS C SCREENING TEST: ICD-10-CM

## 2024-11-04 DIAGNOSIS — G89.29 CHRONIC NECK PAIN: Chronic | ICD-10-CM

## 2024-11-04 DIAGNOSIS — M54.2 CHRONIC NECK PAIN: Chronic | ICD-10-CM

## 2024-11-04 DIAGNOSIS — Z00.00 ROUTINE GENERAL MEDICAL EXAMINATION AT A HEALTH CARE FACILITY: Primary | ICD-10-CM

## 2024-11-04 DIAGNOSIS — E66.3 OVERWEIGHT WITH BODY MASS INDEX (BMI) OF 27 TO 27.9 IN ADULT: ICD-10-CM

## 2024-11-04 DIAGNOSIS — M25.50 CHRONIC PAIN OF MULTIPLE JOINTS: ICD-10-CM

## 2024-11-04 DIAGNOSIS — E89.0 POSTOPERATIVE HYPOTHYROIDISM: Chronic | ICD-10-CM

## 2024-11-04 DIAGNOSIS — Z79.899 OTHER LONG TERM (CURRENT) DRUG THERAPY: ICD-10-CM

## 2024-11-04 DIAGNOSIS — E55.9 VITAMIN D DEFICIENCY: ICD-10-CM

## 2024-11-04 PROBLEM — R42 VERTIGO: Status: RESOLVED | Noted: 2022-03-03 | Resolved: 2024-11-04

## 2024-11-04 LAB
25(OH)D3 SERPL-MCNC: 36.1 NG/ML
ALBUMIN SERPL BCP-MCNC: 3.3 G/DL (ref 3.5–5)
ALBUMIN/GLOB SERPL: 0.9 {RATIO}
ALP SERPL-CCNC: 90 U/L (ref 39–100)
ALT SERPL W P-5'-P-CCNC: 30 U/L (ref 13–56)
ANION GAP SERPL CALCULATED.3IONS-SCNC: 6 MMOL/L (ref 7–16)
AST SERPL W P-5'-P-CCNC: 27 U/L (ref 15–37)
BASOPHILS # BLD AUTO: 0.06 K/UL (ref 0–0.2)
BASOPHILS NFR BLD AUTO: 1.2 % (ref 0–1)
BILIRUB SERPL-MCNC: 0.5 MG/DL (ref ?–1.2)
BILIRUB UR QL STRIP: NEGATIVE
BUN SERPL-MCNC: 17 MG/DL (ref 7–18)
BUN/CREAT SERPL: 25 (ref 6–20)
CALCIUM SERPL-MCNC: 8.8 MG/DL (ref 8.5–10.1)
CHLORIDE SERPL-SCNC: 108 MMOL/L (ref 98–107)
CHOLEST SERPL-MCNC: 197 MG/DL (ref 0–200)
CHOLEST/HDLC SERPL: 3.3 {RATIO}
CLARITY UR: CLEAR
CO2 SERPL-SCNC: 29 MMOL/L (ref 21–32)
COLOR UR: ABNORMAL
CREAT SERPL-MCNC: 0.67 MG/DL (ref 0.55–1.02)
CRP SERPL-MCNC: 0.35 MG/DL (ref 0–0.8)
DIFFERENTIAL METHOD BLD: ABNORMAL
EGFR (NO RACE VARIABLE) (RUSH/TITUS): 109 ML/MIN/1.73M2
EOSINOPHIL # BLD AUTO: 0.03 K/UL (ref 0–0.5)
EOSINOPHIL NFR BLD AUTO: 0.6 % (ref 1–4)
ERYTHROCYTE [DISTWIDTH] IN BLOOD BY AUTOMATED COUNT: 11.6 % (ref 11.5–14.5)
ERYTHROCYTE [SEDIMENTATION RATE] IN BLOOD BY WESTERGREN METHOD: 11 MM/HR (ref 0–20)
EST. AVERAGE GLUCOSE BLD GHB EST-MCNC: 97 MG/DL
GLOBULIN SER-MCNC: 3.8 G/DL (ref 2–4)
GLUCOSE SERPL-MCNC: 85 MG/DL (ref 74–106)
GLUCOSE UR STRIP-MCNC: NORMAL MG/DL
HBA1C MFR BLD HPLC: 5 % (ref 4.5–6.6)
HCT VFR BLD AUTO: 44.5 % (ref 38–47)
HCV AB SER QL: NORMAL
HDLC SERPL-MCNC: 60 MG/DL (ref 40–60)
HGB BLD-MCNC: 14.7 G/DL (ref 12–16)
HIV 1+O+2 AB SERPL QL: NORMAL
IMM GRANULOCYTES # BLD AUTO: 0.01 K/UL (ref 0–0.04)
IMM GRANULOCYTES NFR BLD: 0.2 % (ref 0–0.4)
KETONES UR STRIP-SCNC: NEGATIVE MG/DL
LDLC SERPL CALC-MCNC: 113 MG/DL
LDLC/HDLC SERPL: 1.9 {RATIO}
LEUKOCYTE ESTERASE UR QL STRIP: ABNORMAL
LYMPHOCYTES # BLD AUTO: 1.51 K/UL (ref 1–4.8)
LYMPHOCYTES NFR BLD AUTO: 30.3 % (ref 27–41)
MCH RBC QN AUTO: 30.7 PG (ref 27–31)
MCHC RBC AUTO-ENTMCNC: 33 G/DL (ref 32–36)
MCV RBC AUTO: 92.9 FL (ref 80–96)
MONOCYTES # BLD AUTO: 0.33 K/UL (ref 0–0.8)
MONOCYTES NFR BLD AUTO: 6.6 % (ref 2–6)
MPC BLD CALC-MCNC: 10.6 FL (ref 9.4–12.4)
MUCOUS, UA: ABNORMAL /LPF
NEUTROPHILS # BLD AUTO: 3.04 K/UL (ref 1.8–7.7)
NEUTROPHILS NFR BLD AUTO: 61.1 % (ref 53–65)
NITRITE UR QL STRIP: NEGATIVE
NONHDLC SERPL-MCNC: 137 MG/DL
NRBC # BLD AUTO: 0 X10E3/UL
NRBC, AUTO (.00): 0 %
PH UR STRIP: 6.5 PH UNITS
PLATELET # BLD AUTO: 237 K/UL (ref 150–400)
POTASSIUM SERPL-SCNC: 4.1 MMOL/L (ref 3.5–5.1)
PROT SERPL-MCNC: 7.1 G/DL (ref 6.4–8.2)
PROT UR QL STRIP: NEGATIVE
RBC # BLD AUTO: 4.79 M/UL (ref 4.2–5.4)
RBC # UR STRIP: ABNORMAL /UL
RBC #/AREA URNS HPF: 2 /HPF
RHEUMATOID FACT SER NEPH-ACNC: <10 IU/ML (ref 0–15)
SODIUM SERPL-SCNC: 139 MMOL/L (ref 136–145)
SP GR UR STRIP: 1.02
SQUAMOUS #/AREA URNS LPF: ABNORMAL /HPF
TRIGL SERPL-MCNC: 120 MG/DL (ref 35–150)
URATE SERPL-MCNC: 3.8 MG/DL (ref 2.6–6)
UROBILINOGEN UR STRIP-ACNC: NORMAL MG/DL
VLDLC SERPL-MCNC: 24 MG/DL
WBC # BLD AUTO: 4.98 K/UL (ref 4.5–11)
WBC #/AREA URNS HPF: 2 /HPF

## 2024-11-04 PROCEDURE — 81001 URINALYSIS AUTO W/SCOPE: CPT | Mod: ,,, | Performed by: CLINICAL MEDICAL LABORATORY

## 2024-11-04 PROCEDURE — 86038 ANTINUCLEAR ANTIBODIES: CPT | Mod: ,,, | Performed by: CLINICAL MEDICAL LABORATORY

## 2024-11-04 PROCEDURE — 86803 HEPATITIS C AB TEST: CPT | Mod: ,,, | Performed by: CLINICAL MEDICAL LABORATORY

## 2024-11-04 PROCEDURE — 99396 PREV VISIT EST AGE 40-64: CPT | Mod: ,,, | Performed by: NURSE PRACTITIONER

## 2024-11-04 PROCEDURE — 82306 VITAMIN D 25 HYDROXY: CPT | Mod: ,,, | Performed by: CLINICAL MEDICAL LABORATORY

## 2024-11-04 PROCEDURE — 83036 HEMOGLOBIN GLYCOSYLATED A1C: CPT | Mod: ,,, | Performed by: CLINICAL MEDICAL LABORATORY

## 2024-11-04 PROCEDURE — 80061 LIPID PANEL: CPT | Mod: ,,, | Performed by: CLINICAL MEDICAL LABORATORY

## 2024-11-04 PROCEDURE — 84550 ASSAY OF BLOOD/URIC ACID: CPT | Mod: ,,, | Performed by: CLINICAL MEDICAL LABORATORY

## 2024-11-04 PROCEDURE — 85025 COMPLETE CBC W/AUTO DIFF WBC: CPT | Mod: ,,, | Performed by: CLINICAL MEDICAL LABORATORY

## 2024-11-04 PROCEDURE — 85651 RBC SED RATE NONAUTOMATED: CPT | Mod: ,,, | Performed by: CLINICAL MEDICAL LABORATORY

## 2024-11-04 PROCEDURE — 87389 HIV-1 AG W/HIV-1&-2 AB AG IA: CPT | Mod: ,,, | Performed by: CLINICAL MEDICAL LABORATORY

## 2024-11-04 PROCEDURE — 86431 RHEUMATOID FACTOR QUANT: CPT | Mod: ,,, | Performed by: CLINICAL MEDICAL LABORATORY

## 2024-11-04 PROCEDURE — 86140 C-REACTIVE PROTEIN: CPT | Mod: ,,, | Performed by: CLINICAL MEDICAL LABORATORY

## 2024-11-04 PROCEDURE — 80053 COMPREHEN METABOLIC PANEL: CPT | Mod: ,,, | Performed by: CLINICAL MEDICAL LABORATORY

## 2024-11-04 RX ORDER — PAROXETINE 7.5 MG/1
1 CAPSULE ORAL NIGHTLY
COMMUNITY
Start: 2024-07-25

## 2024-11-04 RX ORDER — VITAMIN B COMPLEX
1 CAPSULE ORAL DAILY
COMMUNITY

## 2024-11-04 RX ORDER — CETIRIZINE HYDROCHLORIDE 10 MG/1
10 TABLET ORAL DAILY
COMMUNITY

## 2024-11-04 RX ORDER — ESTRADIOL 0.5 MG/1
0.5 TABLET ORAL DAILY
COMMUNITY

## 2024-11-04 RX ORDER — LEVOTHYROXINE SODIUM 175 UG/1
175 TABLET ORAL
COMMUNITY

## 2024-11-04 RX ORDER — ZOLPIDEM TARTRATE 5 MG/1
5 TABLET ORAL NIGHTLY
COMMUNITY

## 2024-11-04 RX ORDER — LEVOTHYROXINE SODIUM 150 UG/1
150 TABLET ORAL
COMMUNITY
Start: 2024-07-08

## 2024-11-04 RX ORDER — ACETAMINOPHEN 500 MG
5000 TABLET ORAL DAILY
COMMUNITY

## 2024-11-04 RX ORDER — PROGESTERONE 100 MG/1
100 CAPSULE ORAL 2 TIMES DAILY
COMMUNITY

## 2024-11-04 NOTE — ASSESSMENT & PLAN NOTE
Check arthritis panel along with other labs as discussed.    May need additional f/u visit after lab review.

## 2024-11-04 NOTE — ASSESSMENT & PLAN NOTE
Hx MTC, followed by Dr. Dayron Garcia.  He is monitoring TSH and adusting levothyroxine as indicated.   Reports had TSH checked a couple wks ago.

## 2024-11-04 NOTE — PROGRESS NOTES
[facility]  [address] [place] [address]  Phone: [phone]  Fax: [phone]       PATIENT NAME: Jennifer Mc   : 1980    AGE: 44 y.o. DATE OF ENCOUNTER: 24    MRN: [id number]      PCP: Alexandria Hua FNP    Subjective:     Reason for Visit / Chief Complaint:     274}  Chief Complaint   Patient presents with    Annual Exam     Patient reports to the clinic for yearly exam, states she has been having a lot of joint pain in her hips, neck, back and hands.    Health Maintenance     Care gaps addressed, patient declines all vaccines today.    Susan Diaz, [facility]     Check-up; c/o chronic pain in hips, neck, back, and hands x2 yrs.  Patient self-scheduled appt, last visit with me 2022.    Last year had nodules on side of neck.  Saw Dr. EDEL Kwok - US ordered, had biopsy.  Has thyroid regrowth which Dr. Garcia is monitoring.     Reports Dr. Brower referred her to Dr. Calin Gloria to have hips checked. BOOKER Briones NP injected hips which helped for a while L > R.  No help with PT.  Increased pain after vigorous walking and exercise.  Her father required hip replacement at young age.  Chronic neck pain, like it always has a catch in it.  Struggles with weight, can't lose; struggle to exercise due to pain; Dr. Garcia doses levothyroxine to keep TSH < 0.5 due to history MTC.    Review of Systems:     Review of Systems   Constitutional:  Positive for unexpected weight change. Negative for activity change.   HENT:  Negative for hearing loss, rhinorrhea and trouble swallowing.    Eyes:  Negative for discharge and visual disturbance.   Respiratory:  Negative for chest tightness and wheezing.    Cardiovascular:  Negative for chest pain and palpitations.   Gastrointestinal:  Positive for constipation. Negative for blood in stool, diarrhea and vomiting.   Endocrine: Negative for polydipsia and polyuria.   Genitourinary:  Negative for difficulty urinating, dysuria,  hematuria and menstrual problem.   Musculoskeletal:  Positive for arthralgias and neck pain. Negative for joint swelling.   Neurological:  Negative for weakness and headaches.   Psychiatric/Behavioral:  Negative for confusion and dysphoric mood.        Allergies and Meds: 274}     Review of patient's allergies indicates:  No Known Allergies     Current Outpatient Medications   Medication Sig Dispense Refill    b complex vitamins capsule Take 1 capsule by mouth once daily.      cetirizine (ZYRTEC) 10 MG tablet Take 10 mg by mouth once daily.      cholecalciferol, vitamin D3, (VITAMIN D3) 125 mcg (5,000 unit) Tab Take 5,000 Units by mouth once daily.      estradioL (ESTRACE) 0.5 MG tablet Take 0.5 mg by mouth once daily.      levothyroxine (SYNTHROID) 150 MCG tablet Take 150 mcg by mouth before breakfast.      magnesium carb,citrate,oxide (MAGNESIUM COMPLEX ORAL) Take by mouth.      PARoxetine mesylate,menop.sym, 7.5 mg Cap Take 1 capsule by mouth every evening.      progesterone (PROMETRIUM) 100 MG capsule Take 100 mg by mouth 2 (two) times a day.      SYNTHROID 175 mcg tablet Take 175 mcg by mouth. Sat-Sun      zolpidem (AMBIEN) 5 MG Tab Take 5 mg by mouth every evening.       No current facility-administered medications for this visit.       Labs:274}   I have reviewed labs below:  None in Epic    Medical History: 274}     Past Medical History:   Diagnosis Date    Cancer     Hx of papillary thyroid carcinoma 02/2020    Hypothyroidism       Social History     Tobacco Use   Smoking Status Never   Smokeless Tobacco Never      Past Surgical History:   Procedure Laterality Date    THYROIDECTOMY  04/2020    papillary carcinoma - Dr. Bhavin ADLER        Health Maintenance:      Health Maintenance Topics with due status: Not Due       Topic Last Completion Date    Cervical Cancer Screening 04/20/2022    Colorectal Cancer Screening 01/09/2024    Mammogram 03/20/2024    Lipid Panel 05/22/2024    RSV Vaccine (Age 60+ and  "Pregnant patients) Not Due       Objective:  274}   Vital Signs  Temp: 98 °F (36.7 °C)  Temp Source: Oral  Pulse: 78  Resp: 18  SpO2: 97 %  BP: 97/67  BP Location: Left arm  Patient Position: Sitting  Pain Score:   4  Pain Loc: Neck  Height and Weight  Height: 5' 3" (160 cm)  Weight: 69.8 kg (153 lb 12.8 oz)  BSA (Calculated - sq m): 1.76 sq meters  BMI (Calculated): 27.3  Weight in (lb) to have BMI = 25: 140.8    Over the last two weeks how often have you been bothered by little interest or pleasure in doing things: 0  Over the last two weeks how often have you been bothered by feeling down, depressed or hopeless: 0  PHQ-2 Total Score: 0    Wt Readings from Last 3 Encounters:   11/04/24 69.8 kg (153 lb 12.8 oz)   07/31/24 64.4 kg (142 lb)   07/25/24 64.7 kg (142 lb 9.6 oz)     Physical Exam  Vitals and nursing note reviewed.   Constitutional:       General: She is not in acute distress.     Appearance: Normal appearance. She is not ill-appearing.   HENT:      Head: Normocephalic.      Right Ear: Tympanic membrane, ear canal and external ear normal.      Left Ear: Tympanic membrane, ear canal and external ear normal.      Nose: Nose normal.      Mouth/Throat:      Mouth: Mucous membranes are moist.      Pharynx: Oropharynx is clear. Uvula midline. No posterior oropharyngeal erythema or uvula swelling.   Eyes:      Conjunctiva/sclera: Conjunctivae normal.      Pupils: Pupils are equal, round, and reactive to light.   Neck:      Thyroid: No thyroid mass or thyromegaly.      Vascular: Normal carotid pulses. No carotid bruit.   Cardiovascular:      Rate and Rhythm: Normal rate and regular rhythm.      Pulses: Normal pulses.      Heart sounds: Normal heart sounds.   Pulmonary:      Effort: Pulmonary effort is normal. No respiratory distress.      Breath sounds: Normal breath sounds. No wheezing, rhonchi or rales.   Abdominal:      Palpations: Abdomen is soft.      Tenderness: There is no abdominal tenderness. "   Musculoskeletal:      Cervical back: Neck supple.      Right lower leg: No edema.      Left lower leg: No edema.   Lymphadenopathy:      Cervical: No cervical adenopathy.   Skin:     General: Skin is warm and dry.      Coloration: Skin is not jaundiced or pale.   Neurological:      General: No focal deficit present.      Mental Status: She is alert and oriented to person, place, and time.      Gait: Gait normal.   Psychiatric:         Mood and Affect: Mood normal.         Behavior: Behavior normal.          Assessment and Plan: 274}     1. Routine general medical examination at a health care facility    2. Screening for hyperlipidemia  -     Lipid Panel; Future; Expected date: 11/04/2024    3. Need for hepatitis C screening test  -     Hepatitis C Antibody; Future; Expected date: 11/04/2024    4. Screening for HIV (human immunodeficiency virus)  -     HIV 1/2 Ag/Ab (4th Gen); Future; Expected date: 11/04/2024    5. Other long term (current) drug therapy  -     CBC Auto Differential; Future; Expected date: 11/04/2024  -     Comprehensive Metabolic Panel; Future; Expected date: 11/04/2024  -     Hemoglobin A1C; Future; Expected date: 11/04/2024  -     Urinalysis, Reflex to Urine Culture; Future; Expected date: 11/04/2024    6. Postoperative hypothyroidism  Assessment & Plan:  Hx MTC, followed by Dr. Dayron Garcia.  He is monitoring TSH and adusting levothyroxine as indicated.   Reports had TSH checked a couple wks ago.      7. Chronic pain of multiple joints  Assessment & Plan:  Check arthritis panel along with other labs as discussed.    May need additional f/u visit after lab review.    Orders:  -     CBC Auto Differential; Future; Expected date: 11/04/2024  -     Comprehensive Metabolic Panel; Future; Expected date: 11/04/2024  -     ROLAND EIA w/ Reflex to dsDNA/SAMUEL; Future; Expected date: 11/04/2024  -     C-Reactive Protein; Future; Expected date: 11/04/2024  -     Sedimentation Rate; Future; Expected date:  11/04/2024  -     Rheumatoid Quantitative; Future; Expected date: 11/04/2024  -     Uric Acid; Future; Expected date: 11/04/2024  -     Urinalysis, Reflex to Urine Culture; Future; Expected date: 11/04/2024    8. Chronic neck pain  -     C-Reactive Protein; Future; Expected date: 11/04/2024  -     Sedimentation Rate; Future; Expected date: 11/04/2024    9. Diabetes mellitus screening  -     Hemoglobin A1C; Future; Expected date: 11/04/2024    10. Overweight with body mass index (BMI) of 27 to 27.9 in adult  -     Hemoglobin A1C; Future; Expected date: 11/04/2024    11. Vitamin D deficiency  -     Vitamin D; Future; Expected date: 11/04/2024      Schedule 1 yr wellness f/u but may need additional f/u to address complaints after review of results.  Diagnosis, risks, benefits, and side effects of any meds and treatment plan were discussed with the patient.  Questions were answered to the satisfaction of the patient, and pt verbalized understanding and agreement to treatment plan.      Follow up in about 1 year (around 11/4/2025) for wellness, with fasting labs, and follow-up as needed.    Signature:  ROBERTO Martinez-BC    Future Appointments   Date Time Provider Department Center   5/13/2025 10:20 AM [facility]   11/18/2025  7:40 AM Alexandria Hua FNP [facility]

## 2024-11-05 LAB — ANA SER QL: NEGATIVE

## 2024-11-07 ENCOUNTER — HOSPITAL ENCOUNTER (OUTPATIENT)
Dept: RADIOLOGY | Facility: HOSPITAL | Age: 44
Discharge: HOME | End: 2024-11-07
Attending: NURSE PRACTITIONER
Payer: OTHER GOVERNMENT

## 2024-11-07 DIAGNOSIS — M54.2 CHRONIC NECK PAIN: Chronic | ICD-10-CM

## 2024-11-07 DIAGNOSIS — M25.551 BILATERAL HIP PAIN: ICD-10-CM

## 2024-11-07 DIAGNOSIS — G89.29 CHRONIC NECK PAIN: Chronic | ICD-10-CM

## 2024-11-07 DIAGNOSIS — M25.552 BILATERAL HIP PAIN: ICD-10-CM

## 2024-11-07 DIAGNOSIS — G89.29 CHRONIC BILATERAL LOW BACK PAIN WITHOUT SCIATICA: Chronic | ICD-10-CM

## 2024-11-07 DIAGNOSIS — G89.29 CHRONIC PAIN OF MULTIPLE JOINTS: Primary | Chronic | ICD-10-CM

## 2024-11-07 DIAGNOSIS — M54.50 CHRONIC BILATERAL LOW BACK PAIN WITHOUT SCIATICA: Chronic | ICD-10-CM

## 2024-11-07 DIAGNOSIS — M25.50 CHRONIC PAIN OF MULTIPLE JOINTS: Primary | Chronic | ICD-10-CM

## 2024-11-07 PROCEDURE — 72040 X-RAY EXAM NECK SPINE 2-3 VW: CPT | Mod: 26,,, | Performed by: RADIOLOGY

## 2024-11-07 PROCEDURE — 72040 X-RAY EXAM NECK SPINE 2-3 VW: CPT | Mod: TC

## 2024-11-07 PROCEDURE — 73522 X-RAY EXAM HIPS BI 3-4 VIEWS: CPT | Mod: TC

## 2024-11-07 PROCEDURE — 73522 X-RAY EXAM HIPS BI 3-4 VIEWS: CPT | Mod: 26,,, | Performed by: RADIOLOGY

## 2024-11-11 DIAGNOSIS — M25.50 CHRONIC PAIN OF MULTIPLE JOINTS: Chronic | ICD-10-CM

## 2024-11-11 DIAGNOSIS — G89.29 CHRONIC BILATERAL LOW BACK PAIN WITHOUT SCIATICA: Primary | Chronic | ICD-10-CM

## 2024-11-11 DIAGNOSIS — M54.50 CHRONIC BILATERAL LOW BACK PAIN WITHOUT SCIATICA: Primary | Chronic | ICD-10-CM

## 2024-11-11 DIAGNOSIS — G89.29 CHRONIC PAIN OF MULTIPLE JOINTS: Chronic | ICD-10-CM

## 2024-11-11 RX ORDER — MELOXICAM 15 MG/1
15 TABLET ORAL DAILY PRN
Qty: 90 TABLET | Refills: 0 | Status: SHIPPED | OUTPATIENT
Start: 2024-11-11

## 2025-03-05 ENCOUNTER — OFFICE VISIT (OUTPATIENT)
Dept: FAMILY MEDICINE | Facility: CLINIC | Age: 45
End: 2025-03-05
Payer: OTHER GOVERNMENT

## 2025-03-05 VITALS
HEIGHT: 63 IN | BODY MASS INDEX: 26.82 KG/M2 | TEMPERATURE: 98 F | HEART RATE: 91 BPM | OXYGEN SATURATION: 97 % | WEIGHT: 151.38 LBS | SYSTOLIC BLOOD PRESSURE: 100 MMHG | RESPIRATION RATE: 20 BRPM | DIASTOLIC BLOOD PRESSURE: 65 MMHG

## 2025-03-05 DIAGNOSIS — M25.552 CHRONIC HIP PAIN, BILATERAL: Primary | Chronic | ICD-10-CM

## 2025-03-05 DIAGNOSIS — Z85.850 HX OF PAPILLARY THYROID CARCINOMA: ICD-10-CM

## 2025-03-05 DIAGNOSIS — E89.0 POSTOPERATIVE HYPOTHYROIDISM: Chronic | ICD-10-CM

## 2025-03-05 DIAGNOSIS — G89.29 CHRONIC HIP PAIN, BILATERAL: Primary | Chronic | ICD-10-CM

## 2025-03-05 DIAGNOSIS — G89.29 CHRONIC BILATERAL LOW BACK PAIN WITHOUT SCIATICA: Chronic | ICD-10-CM

## 2025-03-05 DIAGNOSIS — R51.9 RECURRENT HEADACHE: ICD-10-CM

## 2025-03-05 DIAGNOSIS — M54.50 CHRONIC BILATERAL LOW BACK PAIN WITHOUT SCIATICA: Chronic | ICD-10-CM

## 2025-03-05 DIAGNOSIS — M25.551 CHRONIC HIP PAIN, BILATERAL: Primary | Chronic | ICD-10-CM

## 2025-03-05 PROCEDURE — 99214 OFFICE O/P EST MOD 30 MIN: CPT | Mod: ,,, | Performed by: NURSE PRACTITIONER

## 2025-03-05 RX ORDER — MELOXICAM 15 MG/1
15 TABLET ORAL DAILY PRN
Qty: 90 TABLET | Refills: 1 | Status: SHIPPED | OUTPATIENT
Start: 2025-03-05

## 2025-03-05 NOTE — ASSESSMENT & PLAN NOTE
Hx MTC, followed by Dr. Dayron Garcia.  He is monitoring TSH and adusting levothyroxine as indicated.

## 2025-03-05 NOTE — PROGRESS NOTES
[facility]  [address] [place] [address]  Phone: [phone]  Fax: [phone]       PATIENT NAME: Jennifer Mc   : 1980    AGE: 44 y.o. DATE OF ENCOUNTER: 3/5/25    MRN: [id number]      PCP: Alexandria Hua FNP    Subjective:   CHANGE CHIEF COMPLAINT      :[id number]}274}  Chief Complaint   Patient presents with    Hip Pain     Pt presents to the clinic for hip pain she stated it still the same left hurt worse than the right    Health Maintenance     Pt decline all care gaps today     History of Present Illness    HPI:  Patient has ongoing hip pain, previously diagnosed as osteoarthritis based on x-rays. The left hip is worse than the right, but both hips are affected. Meloxicam helps with other aches and pains more than the hip pain specifically.    She was evaluated by an orthopedic specialist, Dr. Calin Gloria, several years ago and received injections from Lurdes Briones, a nurse practitioner, which provided some relief at the time. She underwent physical therapy a few months ago but reports no lasting benefit from this treatment.    She reports an increase in headaches. She has a history of frequent headaches in the past, which improved after thyroid removal. Recently, the headaches have become more bothersome. She is unsure if the headaches are related to weather changes or if there might be another cause. In the past, she was prescribed Topamax by a neurologist for headache prevention, but had memory issues as a side effect.    She has a history of thyroid issues, including regrowth of thyroid tissue after removal. She is currently being monitored by Dr. Garcia her Oncologist, with yearly follow-ups and ultrasounds. She reports lymph nodes that appeared on the left side of the neck, which were biopsied and came back normal.      ROS:  Constitutional: -weight loss  Head: +headaches  Musculoskeletal: +joint pain, -neck pain       Answers submitted by the  patient for this visit:  Review of Systems Questionnaire (Submitted on 3/4/2025)  activity change: No  neck pain: No  hearing loss: No  rhinorrhea: No  trouble swallowing: No  eye discharge: No  visual disturbance: No  chest tightness: No  wheezing: No  chest pain: No  palpitations: No  blood in stool: No  constipation: No  vomiting: No  diarrhea: No  polydipsia: No  polyuria: No  difficulty urinating: No  hematuria: No  menstrual problem: No  dysuria: No  joint swelling: No  arthralgias: No  headaches: Yes  weakness: No  confusion: No  dysphoric mood: No    Allergies and Meds: 274}     Review of patient's allergies indicates:  No Known Allergies     Current Outpatient Medications   Medication Sig Dispense Refill    b complex vitamins capsule Take 1 capsule by mouth once daily.      cetirizine (ZYRTEC) 10 MG tablet Take 10 mg by mouth once daily.      cholecalciferol, vitamin D3, (VITAMIN D3) 125 mcg (5,000 unit) Tab Take 5,000 Units by mouth once daily.      estradioL (ESTRACE) 0.5 MG tablet Take 0.5 mg by mouth once daily.      levothyroxine (SYNTHROID) 150 MCG tablet Take 150 mcg by mouth before breakfast.      magnesium carb,citrate,oxide (MAGNESIUM COMPLEX ORAL) Take by mouth.      PARoxetine mesylate,menop.sym, 7.5 mg Cap Take 1 capsule by mouth every evening.      progesterone (PROMETRIUM) 100 MG capsule Take 100 mg by mouth 2 (two) times a day.      SYNTHROID 175 mcg tablet Take 175 mcg by mouth. Sat-Sun      zolpidem (AMBIEN) 5 MG Tab Take 5 mg by mouth every evening.      meloxicam (MOBIC) 15 MG tablet Take 1 tablet (15 mg total) by mouth daily as needed for Pain. Take with food. Avoid all other NSAIDs while taking this medication. 90 tablet 1     No current facility-administered medications for this visit.       Labs:274}   I have reviewed labs below:    Lab Results   Component Value Date    WBC 4.98 11/04/2024    RBC 4.79 11/04/2024    HGB 14.7 11/04/2024    HCT 44.5 11/04/2024     11/04/2024     " 11/04/2024    K 4.1 11/04/2024     (H) 11/04/2024    CALCIUM 8.8 11/04/2024    GLU 85 11/04/2024    BUN 17 11/04/2024    CREATININE 0.67 11/04/2024    ALT 30 11/04/2024    AST 27 11/04/2024    CHOL 197 11/04/2024    TRIG 120 11/04/2024    HDL 60 11/04/2024    LDLCALC 113 11/04/2024    HGBA1C 5.0 11/04/2024     XR HIP 3 OR 4 VIEWS BILATERAL     CLINICAL HISTORY:  Low back pain, unspecified     TECHNIQUE:  05/05/2023     COMPARISON:  05/05/2023     FINDINGS:  Degenerative changes of the hips bilaterally without acute fracture or dislocation. Very mild narrowing lateral aspect of the right hip joint.     Impression:     As above        Electronically signed by:Jessica Hurtado MD  Date:                                            11/08/2024  Time:                                           14:18        Exam Ended: 11/07/24 10:05 CST     Medical History: 274}     Past Medical History:   Diagnosis Date    Cancer     Hx of papillary thyroid carcinoma 02/2020    Hypothyroidism       Tobacco Use History[1]   Past Surgical History:   Procedure Laterality Date    THYROIDECTOMY  04/2020    papillary carcinoma - Dr. Bhavin ADLER        Health Maintenance:      Health Maintenance Topics with due status: Not Due       Topic Last Completion Date    Colorectal Cancer Screening 01/09/2024    Hemoglobin A1c (Diabetic Prevention Screening) 11/04/2024    Lipid Panel 11/04/2024    RSV Vaccine (Age 60+ and Pregnant patients) Not Due       Objective:  274}   Vital Signs  Temp: 98.3 °F (36.8 °C)  Temp Source: Oral  Pulse: 91  Resp: 20  SpO2: 97 %  BP: 100/65  BP Location: Right arm  Patient Position: Sitting  Pain Score:   2 (hurt more at night)  Pain Loc: Hip  Height and Weight  Height: 5' 3" (160 cm)  Weight: 68.7 kg (151 lb 6.4 oz)  BSA (Calculated - sq m): 1.75 sq meters  BMI (Calculated): 26.8  Weight in (lb) to have BMI = 25: 140.8    Over the last two weeks how often have you been bothered by little interest or pleasure in " doing things: 0  Over the last two weeks how often have you been bothered by feeling down, depressed or hopeless: 0  PHQ-2 Total Score: 0    Wt Readings from Last 3 Encounters:   03/05/25 68.7 kg (151 lb 6.4 oz)   11/04/24 69.8 kg (153 lb 12.8 oz)   07/31/24 64.4 kg (142 lb)     Physical Exam  Vitals and nursing note reviewed.   Constitutional:       General: She is not in acute distress.     Appearance: Normal appearance. She is not ill-appearing.   HENT:      Head: Normocephalic.   Eyes:      Conjunctiva/sclera: Conjunctivae normal.   Cardiovascular:      Rate and Rhythm: Normal rate and regular rhythm.      Heart sounds: Normal heart sounds.   Pulmonary:      Effort: Pulmonary effort is normal. No respiratory distress.      Breath sounds: Normal breath sounds.   Skin:     General: Skin is warm and dry.      Coloration: Skin is not jaundiced or pale.   Neurological:      General: No focal deficit present.      Mental Status: She is alert and oriented to person, place, and time.      Gait: Gait normal.   Psychiatric:         Mood and Affect: Mood normal.         Behavior: Behavior normal.         Thought Content: Thought content normal.         Judgment: Judgment normal.          Assessment and Plan: 274}       1. Chronic hip pain, bilateral  -     meloxicam (MOBIC) 15 MG tablet; Take 1 tablet (15 mg total) by mouth daily as needed for Pain. Take with food. Avoid all other NSAIDs while taking this medication.  Dispense: 90 tablet; Refill: 1  -     Ambulatory referral/consult to Orthopedics; Future; Expected date: 03/12/2025    2. Chronic bilateral low back pain without sciatica  -     meloxicam (MOBIC) 15 MG tablet; Take 1 tablet (15 mg total) by mouth daily as needed for Pain. Take with food. Avoid all other NSAIDs while taking this medication.  Dispense: 90 tablet; Refill: 1    3. Hx of papillary thyroid carcinoma  Assessment & Plan:  Dr. Garcia monitoring thyroid function.  Yearly thyroid US due to regrowth of  thyroid tissue.      4. Postoperative hypothyroidism  Assessment & Plan:  Hx MTC, followed by Dr. Dayron Garcia.  He is monitoring TSH and adusting levothyroxine as indicated.         5. Recurrent headache        Assessment & Plan    IMPRESSION:  - Assessed ongoing hip pain related to osteoarthritis, confirmed by x-rays  - Evaluated efficacy of meloxicam for hip pain management, noting limited effectiveness for hip pain but helps other aches and pains  - Considered physical therapy benefits, noting limited lasting effect from recent course  - Discussed potential for orthopedic intervention, weighing patient's age against symptom severity  - Evaluated headache patterns, considering barometric pressure changes as primary trigger  - Noted history of thyroid removal and current regrowth monitoring by Dr. Garcia    HIP OSTEOARTHRITIS:  - Referred the patient to Dr. Calin Gloria at [facility] for chronic hip pain evaluation and potential treatment options.  - Patient reports ongoing hip pain, with the left hip being worse than the right.  - X-rays show osteoarthritis in the hips.  - Noted that meloxicam helps with other aches and pains more than the hips.  - Patient had physical therapy a few months ago but reports no lasting benefit.  - Discussed the possibility of returning to orthopedics for further evaluation and potential treatment options.  - Patient reports family history of hip replacements, with father having the procedure at a young age.  - Acknowledged family history as a factor in considering treatment options for hip osteoarthritis.    HEADACHES:  - Patient reports an increase in headache frequency, possibly related to weather changes and barometric pressure.  - Explained potential link between weather changes, barometric pressure, and headache triggers.  - Discussed rebound headache risk with frequent use of pain medications.  - Instructed the patient to track headache occurrences and potential triggers,  particularly in relation to weather patterns.  F/u if headaches appear to be triggered by other things than barometric pressure changes.  - Recommend maintaining hydration to potentially mitigate weather-related headaches.  - Advised taking Tylenol early when feeling a headache coming on.  - Cautioned against mixing NSAIDs due to current meloxicam use.    THYROID DISORDER:    - Patient has a history of thyroid removal due to cancer and is being monitored by Dr. Garcia with yearly ultrasounds.  - Noted regrowth of thyroid tissue and presence of lymph nodes that were biopsied and found to be benign.    FOLLOW UP:  - Follow up in November for wellness visit.  - Follow up sooner if needed.         Signature:  ROBERTO Martinez-BC    Future Appointments   Date Time Provider Department Center   5/13/2025 10:20 AM [facility]MO1 [facility]   11/18/2025  7:40 AM Alexandria Hua FNP [facility]     This note was generated with the assistance of ambient listening technology. Verbal consent was obtained by the patient and accompanying visitor(s) for the recording of patient appointment to facilitate this note. I attest to having reviewed and edited the generated note for accuracy, though some syntax or spelling errors may persist. Please contact the author of this note for any clarification.           [1]   Social History  Tobacco Use   Smoking Status Never   Smokeless Tobacco Never

## 2025-05-13 ENCOUNTER — HOSPITAL ENCOUNTER (OUTPATIENT)
Dept: RADIOLOGY | Facility: HOSPITAL | Age: 45
Discharge: HOME | End: 2025-05-13
Attending: RADIOLOGY
Payer: OTHER GOVERNMENT

## 2025-05-13 DIAGNOSIS — Z12.31 VISIT FOR SCREENING MAMMOGRAM: ICD-10-CM

## 2025-05-13 PROCEDURE — 77063 BREAST TOMOSYNTHESIS BI: CPT | Mod: TC

## 2025-05-13 PROCEDURE — 77067 SCR MAMMO BI INCL CAD: CPT | Mod: 26,,, | Performed by: RADIOLOGY

## 2025-05-13 PROCEDURE — 77063 BREAST TOMOSYNTHESIS BI: CPT | Mod: 26,,, | Performed by: RADIOLOGY

## 2025-08-18 ENCOUNTER — PATIENT MESSAGE (OUTPATIENT)
Dept: FAMILY MEDICINE | Facility: CLINIC | Age: 45
End: 2025-08-18
Payer: OTHER GOVERNMENT